# Patient Record
Sex: FEMALE | Race: WHITE | NOT HISPANIC OR LATINO | Employment: OTHER | URBAN - METROPOLITAN AREA
[De-identification: names, ages, dates, MRNs, and addresses within clinical notes are randomized per-mention and may not be internally consistent; named-entity substitution may affect disease eponyms.]

---

## 2018-05-11 PROBLEM — D72.819 LEUKOPENIA: Status: ACTIVE | Noted: 2018-05-11

## 2018-05-11 PROBLEM — R15.9 INCONTINENCE OF FECES: Status: ACTIVE | Noted: 2018-05-11

## 2019-05-06 PROBLEM — D72.819 LEUKOPENIA: Status: RESOLVED | Noted: 2018-05-11 | Resolved: 2019-05-06

## 2019-05-06 PROBLEM — R09.89 DIMINISHED PULSES IN LOWER EXTREMITY: Status: ACTIVE | Noted: 2019-05-06

## 2023-12-08 PROBLEM — K92.2 GI BLEED: Status: ACTIVE | Noted: 2023-12-08

## 2023-12-08 PROBLEM — Z71.89 ADVANCE CARE PLANNING: Status: ACTIVE | Noted: 2023-12-08

## 2023-12-08 PROBLEM — R42 DIZZINESS: Status: ACTIVE | Noted: 2023-12-08

## 2023-12-09 PROBLEM — J96.01 ACUTE RESPIRATORY FAILURE WITH HYPOXIA (HCC): Status: ACTIVE | Noted: 2023-12-09

## 2023-12-26 PROBLEM — M48.061 FORAMINAL STENOSIS OF LUMBAR REGION: Status: ACTIVE | Noted: 2023-12-26

## 2023-12-26 PROBLEM — D62 ANEMIA DUE TO ACUTE BLOOD LOSS: Status: ACTIVE | Noted: 2023-12-26

## 2024-01-16 PROBLEM — R10.84 GENERALIZED ABDOMINAL PAIN: Status: ACTIVE | Noted: 2024-01-16

## 2024-01-16 PROBLEM — F11.20 NARCOTIC DEPENDENCE (HCC): Status: ACTIVE | Noted: 2024-01-16

## 2024-01-16 PROBLEM — R42 DIZZINESS: Status: RESOLVED | Noted: 2023-12-08 | Resolved: 2024-01-16

## 2024-01-16 PROBLEM — R15.9 INCONTINENCE OF FECES: Status: RESOLVED | Noted: 2018-05-11 | Resolved: 2024-01-16

## 2024-01-16 PROBLEM — R09.89 DIMINISHED PULSES IN LOWER EXTREMITY: Status: RESOLVED | Noted: 2019-05-06 | Resolved: 2024-01-16

## 2024-04-04 PROBLEM — I73.9 PERIPHERAL ARTERIAL DISEASE (HCC): Status: ACTIVE | Noted: 2024-04-04

## 2024-09-11 PROBLEM — R10.84 GENERALIZED ABDOMINAL PAIN: Status: RESOLVED | Noted: 2024-01-16 | Resolved: 2024-09-11

## 2024-09-11 PROBLEM — J96.01 ACUTE RESPIRATORY FAILURE WITH HYPOXIA (HCC): Status: RESOLVED | Noted: 2023-12-09 | Resolved: 2024-09-11

## 2024-09-11 PROBLEM — D62 ANEMIA DUE TO ACUTE BLOOD LOSS: Status: RESOLVED | Noted: 2023-12-26 | Resolved: 2024-09-11

## 2024-10-15 PROBLEM — Z71.89 ADVANCE CARE PLANNING: Status: RESOLVED | Noted: 2023-12-08 | Resolved: 2024-10-15

## 2024-10-15 PROBLEM — I10 PRIMARY HYPERTENSION: Status: ACTIVE | Noted: 2024-10-15

## 2024-10-15 PROBLEM — E78.2 MIXED HYPERLIPIDEMIA: Status: ACTIVE | Noted: 2024-10-15

## 2024-10-16 PROBLEM — F11.20 NARCOTIC DEPENDENCE (HCC): Status: RESOLVED | Noted: 2024-01-16 | Resolved: 2024-10-16

## 2024-10-16 PROBLEM — K21.00 GASTROESOPHAGEAL REFLUX DISEASE WITH ESOPHAGITIS WITHOUT HEMORRHAGE: Status: ACTIVE | Noted: 2024-10-16

## 2024-10-29 DIAGNOSIS — Z00.6 CLINICAL TRIAL PARTICIPANT: ICD-10-CM

## 2024-11-08 ENCOUNTER — RESEARCH ENCOUNTER (OUTPATIENT)
Dept: RESEARCH | Facility: MEDICAL CENTER | Age: 84
End: 2024-11-08

## 2024-11-08 NOTE — RESEARCH NOTE
Patient requires a saliva kit to be shipped. Verified the information below:    Address:  [address]  [address]  [facility] [address]    Phone: [phone]  Email: [email or web address]    Added patient information to virtual kit tracker.    CDCT1 Order Canceled: Yes    Saliva Order Created: Requested    Kit Shipped: Requested

## 2024-11-12 ENCOUNTER — RESEARCH ENCOUNTER (OUTPATIENT)
Dept: RESEARCH | Facility: MEDICAL CENTER | Age: 84
End: 2024-11-12

## 2024-11-12 DIAGNOSIS — Z00.6 RESEARCH STUDY PATIENT: ICD-10-CM

## 2024-11-12 DIAGNOSIS — Z00.6 CLINICAL TRIAL PARTICIPANT: ICD-10-CM

## 2024-11-12 NOTE — RESEARCH NOTE
Patient requires a saliva kit to be shipped. Verified the information below:    Address:  [address]  [address]  [facility] [address]    Phone: [phone]  Email: [email or web address]    Added patient information to virtual kit tracker.    CDCT1 Order Canceled: Yes    Saliva Order Created: Yes    Kit Shipped: Yes    Tracking #:[id number]

## 2024-12-09 LAB
APOB+LDLR+PCSK9 GENE MUT ANL BLD/T: NOT DETECTED
BRCA1+BRCA2 DEL+DUP + FULL MUT ANL BLD/T: NOT DETECTED
MLH1+MSH2+MSH6+PMS2 GN DEL+DUP+FUL M: NOT DETECTED

## 2025-02-11 PROBLEM — Z87.19 HISTORY OF GI BLEED: Status: ACTIVE | Noted: 2023-12-08

## 2025-02-21 ENCOUNTER — RESULTS FOLLOW-UP (OUTPATIENT)
Dept: RESEARCH | Facility: MEDICAL CENTER | Age: 85
End: 2025-02-21